# Patient Record
Sex: FEMALE | Race: WHITE | NOT HISPANIC OR LATINO
[De-identification: names, ages, dates, MRNs, and addresses within clinical notes are randomized per-mention and may not be internally consistent; named-entity substitution may affect disease eponyms.]

---

## 2020-12-02 ENCOUNTER — APPOINTMENT (OUTPATIENT)
Dept: GASTROENTEROLOGY | Facility: CLINIC | Age: 30
End: 2020-12-02
Payer: COMMERCIAL

## 2020-12-02 VITALS
HEIGHT: 60.25 IN | DIASTOLIC BLOOD PRESSURE: 92 MMHG | WEIGHT: 207 LBS | SYSTOLIC BLOOD PRESSURE: 140 MMHG | HEART RATE: 101 BPM | BODY MASS INDEX: 40.11 KG/M2

## 2020-12-02 DIAGNOSIS — K52.9 NONINFECTIVE GASTROENTERITIS AND COLITIS, UNSPECIFIED: ICD-10-CM

## 2020-12-02 DIAGNOSIS — K62.5 HEMORRHAGE OF ANUS AND RECTUM: ICD-10-CM

## 2020-12-02 DIAGNOSIS — Z12.11 ENCOUNTER FOR SCREENING FOR MALIGNANT NEOPLASM OF COLON: ICD-10-CM

## 2020-12-02 DIAGNOSIS — Z91.89 OTHER SPECIFIED PERSONAL RISK FACTORS, NOT ELSEWHERE CLASSIFIED: ICD-10-CM

## 2020-12-02 PROCEDURE — 99204 OFFICE O/P NEW MOD 45 MIN: CPT

## 2020-12-02 PROCEDURE — 99072 ADDL SUPL MATRL&STAF TM PHE: CPT

## 2020-12-04 PROBLEM — K52.9 CHRONIC DIARRHEA: Status: ACTIVE | Noted: 2020-12-04

## 2020-12-04 NOTE — HISTORY OF PRESENT ILLNESS
[FreeTextEntry1] : 30 year F h/o GERD on PPI, PCOS, Fibromyalgia, bipolar 2, anxiety, spondyloarthropathy , exercise induced asthma, cyst removed from frontal lobe, s/p gastric sleeve 2010, s/p adenoidectomy, family h/o colon polyps, presents for evaluation of colon cancer screening. \par She is seen at the request of Dr. Kareen Souza. \par \par No prior colonoscopy or EGD\par previous nausea and vomiting from GERD now resolved on PPI\par BRB occasionally on TP, and prolapse from rectum a couple of weeks ago. \par she has chronic diarrhea\par \par Patient denies abdominal pain , dysphagia/odynophagia, change in bowel habits, constipation, brbpr, melena. no weight loss.  Good appetite. Patient has daily BM,\par takes NSAIDS PRN\par \par father- colon polyps\par brother 28 yo - colon polyps\par \par PMD: Dr. Kareen Souza

## 2020-12-04 NOTE — PHYSICAL EXAM
[General Appearance - Alert] : alert [General Appearance - In No Acute Distress] : in no acute distress [Sclera] : the sclera and conjunctiva were normal [Outer Ear] : the ears and nose were normal in appearance [Neck Appearance] : the appearance of the neck was normal [] : no respiratory distress [Apical Impulse] : the apical impulse was normal [Abdomen Soft] : soft [Abdomen Tenderness] : non-tender [FreeTextEntry1] : external hemorrhoid , normal RENATE, brown stool. Chaperoned by Dina Abernathy MA [Abnormal Walk] : normal gait [Skin Color & Pigmentation] : normal skin color and pigmentation [Cranial Nerves] : cranial nerves 2-12 were intact [Oriented To Time, Place, And Person] : oriented to person, place, and time

## 2020-12-04 NOTE — ASSESSMENT
[FreeTextEntry1] : Colon cancer screening- \par due to family h/o colon cancer /colon polyps recommend she start screening now.\par Risks (including but not limited to sedation risks, infection, bleeding, perforation, possibility of missed lesions), benefits and alternatives to procedure, including not doing the procedure, were discussed with patient. Patient understood and agreed to proceed with colonoscopy. \par Colonoscopy preparation instructions reviewed with patient.\par 2 Dulcolax two days prior to procedure + Split MiraLAX/Dulcolax preparation starting day prior to procedure\par \par rectal bleeding- likely hemorrhoids, colonoscopy to r/o proximal lesions\par \par chronic diarrhea- will obtain biopsies to r/o colitis at time of colonoscopy\par \par GERD- EGD, continue PPI

## 2021-01-06 ENCOUNTER — APPOINTMENT (OUTPATIENT)
Dept: INTERNAL MEDICINE | Facility: CLINIC | Age: 31
End: 2021-01-06
Payer: COMMERCIAL

## 2021-01-06 VITALS — BODY MASS INDEX: 40.11 KG/M2 | TEMPERATURE: 98 F | WEIGHT: 207 LBS | HEIGHT: 60.25 IN

## 2021-01-06 VITALS — DIASTOLIC BLOOD PRESSURE: 80 MMHG | SYSTOLIC BLOOD PRESSURE: 120 MMHG

## 2021-01-06 DIAGNOSIS — F41.9 ANXIETY DISORDER, UNSPECIFIED: ICD-10-CM

## 2021-01-06 DIAGNOSIS — Z23 ENCOUNTER FOR IMMUNIZATION: ICD-10-CM

## 2021-01-06 DIAGNOSIS — F98.8 OTHER SPECIFIED BEHAVIORAL AND EMOTIONAL DISORDERS WITH ONSET USUALLY OCCURRING IN CHILDHOOD AND ADOLESCENCE: ICD-10-CM

## 2021-01-06 DIAGNOSIS — Z84.0 FAMILY HISTORY OF DISEASES OF THE SKIN AND SUBCUTANEOUS TISSUE: ICD-10-CM

## 2021-01-06 DIAGNOSIS — Z82.61 FAMILY HISTORY OF ARTHRITIS: ICD-10-CM

## 2021-01-06 DIAGNOSIS — T78.40XA ALLERGY, UNSPECIFIED, INITIAL ENCOUNTER: ICD-10-CM

## 2021-01-06 DIAGNOSIS — Z83.71 FAMILY HISTORY OF COLONIC POLYPS: ICD-10-CM

## 2021-01-06 DIAGNOSIS — J45.990 EXERCISE INDUCED BRONCHOSPASM: ICD-10-CM

## 2021-01-06 DIAGNOSIS — M47.819 SPONDYLOSIS W/OUT MYELOPATHY OR RADICULOPATHY, SITE UNSPECIFIED: ICD-10-CM

## 2021-01-06 DIAGNOSIS — Z00.00 ENCOUNTER FOR GENERAL ADULT MEDICAL EXAMINATION W/OUT ABNORMAL FINDINGS: ICD-10-CM

## 2021-01-06 DIAGNOSIS — Z11.1 ENCOUNTER FOR SCREENING FOR RESPIRATORY TUBERCULOSIS: ICD-10-CM

## 2021-01-06 DIAGNOSIS — F17.200 NICOTINE DEPENDENCE, UNSPECIFIED, UNCOMPLICATED: ICD-10-CM

## 2021-01-06 PROCEDURE — G0444 DEPRESSION SCREEN ANNUAL: CPT

## 2021-01-06 PROCEDURE — 90715 TDAP VACCINE 7 YRS/> IM: CPT

## 2021-01-06 PROCEDURE — 90472 IMMUNIZATION ADMIN EACH ADD: CPT

## 2021-01-06 PROCEDURE — 99072 ADDL SUPL MATRL&STAF TM PHE: CPT

## 2021-01-06 PROCEDURE — 90620 MENB-4C VACCINE IM: CPT

## 2021-01-06 PROCEDURE — 36415 COLL VENOUS BLD VENIPUNCTURE: CPT

## 2021-01-06 PROCEDURE — 99385 PREV VISIT NEW AGE 18-39: CPT | Mod: 25

## 2021-01-06 PROCEDURE — 90471 IMMUNIZATION ADMIN: CPT

## 2021-01-06 RX ORDER — ALPRAZOLAM 0.25 MG/1
0.25 TABLET ORAL
Refills: 0 | Status: ACTIVE | COMMUNITY
Start: 2021-01-06

## 2021-01-06 RX ORDER — PAROXETINE HYDROCHLORIDE 20 MG/1
20 TABLET, FILM COATED ORAL DAILY
Refills: 0 | Status: ACTIVE | COMMUNITY

## 2021-01-06 RX ORDER — DEXTROAMPHETAMINE SACCHARATE, AMPHETAMINE ASPARTATE, DEXTROAMPHETAMINE SULFATE, AND AMPHETAMINE SULFATE 5; 5; 5; 5 MG/1; MG/1; MG/1; MG/1
20 TABLET ORAL DAILY
Refills: 0 | Status: ACTIVE | COMMUNITY

## 2021-01-06 RX ORDER — DEXTROAMPHETAMINE SACCHARATE, AMPHETAMINE ASPARTATE MONOHYDRATE, DEXTROAMPHETAMINE SULFATE AND AMPHETAMINE SULFATE 5; 5; 5; 5 MG/1; MG/1; MG/1; MG/1
20 CAPSULE, EXTENDED RELEASE ORAL DAILY
Qty: 30 | Refills: 0 | Status: ACTIVE | COMMUNITY
Start: 2021-01-06

## 2021-01-06 RX ORDER — FEXOFENADINE HCL 60 MG
CAPSULE ORAL
Refills: 0 | Status: ACTIVE | COMMUNITY

## 2021-01-06 RX ORDER — ALBUTEROL 90 MCG
90 AEROSOL (GRAM) INHALATION
Refills: 0 | Status: DISCONTINUED | COMMUNITY
End: 2021-01-06

## 2021-01-06 NOTE — HISTORY OF PRESENT ILLNESS
[FreeTextEntry1] : physical [de-identified] : Patient with a hx of PCOS, Sleeve in 2011, Bipolar 2, anxiety, eczema, fibromyalgia, spondyloarthropathy presents today for annual exam and for school physical forms\par \par GYN: Dr. 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

## 2021-01-06 NOTE — HEALTH RISK ASSESSMENT
[] : Yes [6-10] : 6-10 [2 - 4 times a month (2 pts)] : 2-4 times a month (2 points) [1 or 2 (0 pts)] : 1 or 2 (0 points) [Never (0 pts)] : Never (0 points) [Yes] : In the past 12 months have you used drugs other than those required for medical reasons? Yes [No falls in past year] : Patient reported no falls in the past year [0] : 2) Feeling down, depressed, or hopeless: Not at all (0) [Patient reported PAP Smear was abnormal] : Patient reported PAP Smear was abnormal [HIV Test offered] : HIV Test offered [Hepatitis C test offered] : Hepatitis C test offered [With Family] : lives with family [# of Members in Household ___] :  household currently consist of [unfilled] member(s) [Unemployed] : unemployed [Student] : student [College] : College [Single] : single [# Of Children ___] : has [unfilled] children [Feels Safe at Home] : Feels safe at home [Reports normal functional visual acuity (ie: able to read med bottle)] : Reports normal functional visual acuity [Smoke Detector] : smoke detector [Carbon Monoxide Detector] : carbon monoxide detector [Seat Belt] :  uses seat belt [Sunscreen] : uses sunscreen [With Patient/Caregiver] : With Patient/Caregiver [de-identified] : socially  [Audit-CScore] : 2 [de-identified] : marijuana  [de-identified] : walks hikes stretching  [de-identified] : normal  [[id number]] : 0 [Reports changes in hearing] : Reports no changes in hearing [Reports changes in vision] : Reports no changes in vision [Reports changes in dental health] : Reports no changes in dental health [Guns at Home] : no guns at home [Safety elements used in home] : no safety elements used in home [Travel to Developing Areas] : does not  travel to developing areas [Caregiver Concerns] : does not have caregiver concerns [MammogramDate] : never [PapSmearDate] : 9/2020 [PapSmearComments] : [facility]  [BoneDensityDate] : never [ColonoscopyDate] : never [ColonoscopyComments] : has apt 1/25, family hx of prostate cancer  [de-identified] : [employer] @ [employer]  [FreeTextEntry2] : covid -unemployed  [de-identified] : in college now  [de-identified] : last exam 12/2020 [de-identified] : last exam 1/2020 [AdvancecareDate] : 1/2021

## 2021-01-06 NOTE — PHYSICAL EXAM
[No Acute Distress] : no acute distress [Well Nourished] : well nourished [Well Developed] : well developed [Well-Appearing] : well-appearing [Normal Sclera/Conjunctiva] : normal sclera/conjunctiva [PERRL] : pupils equal round and reactive to light [EOMI] : extraocular movements intact [Normal Outer Ear/Nose] : the outer ears and nose were normal in appearance [Normal Oropharynx] : the oropharynx was normal [No Lymphadenopathy] : no lymphadenopathy [Supple] : supple [Thyroid Normal, No Nodules] : the thyroid was normal and there were no nodules present [No Respiratory Distress] : no respiratory distress  [No Accessory Muscle Use] : no accessory muscle use [Clear to Auscultation] : lungs were clear to auscultation bilaterally [Normal Rate] : normal rate  [Regular Rhythm] : with a regular rhythm [Normal S1, S2] : normal S1 and S2 [No Murmur] : no murmur heard [No Carotid Bruits] : no carotid bruits [No Abdominal Bruit] : a ~M bruit was not heard ~T in the abdomen [No Varicosities] : no varicosities [Pedal Pulses Present] : the pedal pulses are present [No Edema] : there was no peripheral edema [No Palpable Aorta] : no palpable aorta [No Extremity Clubbing/Cyanosis] : no extremity clubbing/cyanosis [Soft] : abdomen soft [Non Tender] : non-tender [Non-distended] : non-distended [No Masses] : no abdominal mass palpated [No HSM] : no HSM [Normal Bowel Sounds] : normal bowel sounds [Normal Posterior Cervical Nodes] : no posterior cervical lymphadenopathy [Normal Anterior Cervical Nodes] : no anterior cervical lymphadenopathy [No CVA Tenderness] : no CVA  tenderness [No Spinal Tenderness] : no spinal tenderness [No Joint Swelling] : no joint swelling [Grossly Normal Strength/Tone] : grossly normal strength/tone [No Rash] : no rash [Coordination Grossly Intact] : coordination grossly intact [No Focal Deficits] : no focal deficits [Normal Gait] : normal gait [Normal Affect] : the affect was normal [Normal Insight/Judgement] : insight and judgment were intact

## 2021-01-07 LAB
25(OH)D3 SERPL-MCNC: 18.5 NG/ML
ALBUMIN SERPL ELPH-MCNC: 4.4 G/DL
ALP BLD-CCNC: 83 U/L
ALT SERPL-CCNC: 50 U/L
ANION GAP SERPL CALC-SCNC: 12 MMOL/L
AST SERPL-CCNC: 41 U/L
BASOPHILS # BLD AUTO: 0.08 K/UL
BASOPHILS NFR BLD AUTO: 1.1 %
BILIRUB SERPL-MCNC: 0.2 MG/DL
BUN SERPL-MCNC: 7 MG/DL
CALCIUM SERPL-MCNC: 9.4 MG/DL
CHLORIDE SERPL-SCNC: 103 MMOL/L
CHOLEST SERPL-MCNC: 213 MG/DL
CO2 SERPL-SCNC: 23 MMOL/L
CREAT SERPL-MCNC: 0.77 MG/DL
EOSINOPHIL # BLD AUTO: 0.18 K/UL
EOSINOPHIL NFR BLD AUTO: 2.6 %
ESTIMATED AVERAGE GLUCOSE: 97 MG/DL
GLUCOSE SERPL-MCNC: 83 MG/DL
HBA1C MFR BLD HPLC: 5 %
HBV CORE IGG+IGM SER QL: NONREACTIVE
HBV SURFACE AB SER QL: REACTIVE
HBV SURFACE AG SER QL: NONREACTIVE
HCT VFR BLD CALC: 42.6 %
HDLC SERPL-MCNC: 68 MG/DL
HGB BLD-MCNC: 13.7 G/DL
IMM GRANULOCYTES NFR BLD AUTO: 0.4 %
LDLC SERPL CALC-MCNC: 120 MG/DL
LYMPHOCYTES # BLD AUTO: 1.93 K/UL
LYMPHOCYTES NFR BLD AUTO: 27.4 %
MAN DIFF?: NORMAL
MCHC RBC-ENTMCNC: 28.5 PG
MCHC RBC-ENTMCNC: 32.2 GM/DL
MCV RBC AUTO: 88.8 FL
MEV IGG FLD QL IA: 151 AU/ML
MEV IGG+IGM SER-IMP: POSITIVE
MONOCYTES # BLD AUTO: 0.65 K/UL
MONOCYTES NFR BLD AUTO: 9.2 %
MUV AB SER-ACNC: POSITIVE
MUV IGG SER QL IA: 35.8 AU/ML
NEUTROPHILS # BLD AUTO: 4.18 K/UL
NEUTROPHILS NFR BLD AUTO: 59.3 %
NONHDLC SERPL-MCNC: 145 MG/DL
PLATELET # BLD AUTO: 343 K/UL
POTASSIUM SERPL-SCNC: 4.6 MMOL/L
PROT SERPL-MCNC: 7 G/DL
RBC # BLD: 4.8 M/UL
RBC # FLD: 13.1 %
RUBV IGG FLD-ACNC: 3.4 INDEX
RUBV IGG SER-IMP: POSITIVE
SODIUM SERPL-SCNC: 139 MMOL/L
TRIGL SERPL-MCNC: 125 MG/DL
TSH SERPL-ACNC: 1.66 UIU/ML
VZV AB TITR SER: POSITIVE
VZV IGG SER IF-ACNC: 426.6 INDEX
WBC # FLD AUTO: 7.05 K/UL

## 2021-01-11 LAB
M TB IFN-G BLD-IMP: NEGATIVE
QUANTIFERON TB PLUS MITOGEN MINUS NIL: >10 IU/ML
QUANTIFERON TB PLUS NIL: 0.02 IU/ML
QUANTIFERON TB PLUS TB1 MINUS NIL: 0 IU/ML
QUANTIFERON TB PLUS TB2 MINUS NIL: 0 IU/ML

## 2021-01-20 ENCOUNTER — TRANSCRIPTION ENCOUNTER (OUTPATIENT)
Age: 31
End: 2021-01-20

## 2021-01-20 ENCOUNTER — APPOINTMENT (OUTPATIENT)
Dept: OBGYN | Facility: CLINIC | Age: 31
End: 2021-01-20
Payer: COMMERCIAL

## 2021-01-20 VITALS
DIASTOLIC BLOOD PRESSURE: 70 MMHG | BODY MASS INDEX: 41.62 KG/M2 | SYSTOLIC BLOOD PRESSURE: 120 MMHG | TEMPERATURE: 98.6 F | WEIGHT: 212 LBS | HEIGHT: 60 IN

## 2021-01-20 DIAGNOSIS — Z86.59 PERSONAL HISTORY OF OTHER MENTAL AND BEHAVIORAL DISORDERS: ICD-10-CM

## 2021-01-20 DIAGNOSIS — J45.20 MILD INTERMITTENT ASTHMA, UNCOMPLICATED: ICD-10-CM

## 2021-01-20 DIAGNOSIS — Z87.19 PERSONAL HISTORY OF OTHER DISEASES OF THE DIGESTIVE SYSTEM: ICD-10-CM

## 2021-01-20 PROCEDURE — 99385 PREV VISIT NEW AGE 18-39: CPT

## 2021-01-20 PROCEDURE — 99072 ADDL SUPL MATRL&STAF TM PHE: CPT

## 2021-01-25 ENCOUNTER — RESULT REVIEW (OUTPATIENT)
Age: 31
End: 2021-01-25

## 2021-01-27 ENCOUNTER — RESULT REVIEW (OUTPATIENT)
Age: 31
End: 2021-01-27

## 2021-01-28 ENCOUNTER — APPOINTMENT (OUTPATIENT)
Dept: GASTROENTEROLOGY | Facility: HOSPITAL | Age: 31
End: 2021-01-28

## 2021-01-28 ENCOUNTER — RESULT REVIEW (OUTPATIENT)
Age: 31
End: 2021-01-28

## 2021-01-30 ENCOUNTER — NON-APPOINTMENT (OUTPATIENT)
Age: 31
End: 2021-01-30

## 2021-05-07 ENCOUNTER — NON-APPOINTMENT (OUTPATIENT)
Age: 31
End: 2021-05-07

## 2021-05-15 ENCOUNTER — APPOINTMENT (OUTPATIENT)
Dept: INTERNAL MEDICINE | Facility: CLINIC | Age: 31
End: 2021-05-15
Payer: COMMERCIAL

## 2021-05-15 VITALS — HEIGHT: 60 IN | TEMPERATURE: 98 F | BODY MASS INDEX: 40.25 KG/M2 | WEIGHT: 205 LBS

## 2021-05-15 VITALS — DIASTOLIC BLOOD PRESSURE: 70 MMHG | SYSTOLIC BLOOD PRESSURE: 120 MMHG

## 2021-05-15 DIAGNOSIS — F31.81 BIPOLAR II DISORDER: ICD-10-CM

## 2021-05-15 PROCEDURE — 99213 OFFICE O/P EST LOW 20 MIN: CPT

## 2021-05-15 PROCEDURE — 99072 ADDL SUPL MATRL&STAF TM PHE: CPT

## 2021-05-15 NOTE — HISTORY OF PRESENT ILLNESS
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

## 2021-05-15 NOTE — PHYSICAL EXAM
[Normal Sclera/Conjunctiva] : normal sclera/conjunctiva [Normal Outer Ear/Nose] : the outer ears and nose were normal in appearance [Normal] : normal rate, regular rhythm, normal S1 and S2 and no murmur heard [No Edema] : there was no peripheral edema

## 2021-07-12 ENCOUNTER — APPOINTMENT (OUTPATIENT)
Dept: INTERNAL MEDICINE | Facility: CLINIC | Age: 31
End: 2021-07-12

## 2021-07-28 ENCOUNTER — APPOINTMENT (OUTPATIENT)
Dept: GASTROENTEROLOGY | Facility: CLINIC | Age: 31
End: 2021-07-28
Payer: COMMERCIAL

## 2021-07-28 VITALS
WEIGHT: 200 LBS | TEMPERATURE: 98 F | OXYGEN SATURATION: 99 % | DIASTOLIC BLOOD PRESSURE: 84 MMHG | HEIGHT: 60 IN | RESPIRATION RATE: 16 BRPM | SYSTOLIC BLOOD PRESSURE: 122 MMHG | BODY MASS INDEX: 39.27 KG/M2 | HEART RATE: 87 BPM

## 2021-07-28 DIAGNOSIS — R14.2 ERUCTATION: ICD-10-CM

## 2021-07-28 PROCEDURE — 99213 OFFICE O/P EST LOW 20 MIN: CPT

## 2021-07-28 RX ORDER — OMEPRAZOLE 20 MG/1
20 CAPSULE, DELAYED RELEASE ORAL
Qty: 90 | Refills: 3 | Status: DISCONTINUED | COMMUNITY
End: 2021-07-28

## 2021-07-28 NOTE — ASSESSMENT
[FreeTextEntry1] : Belching-\par suspect diet.lifestyle facotr\par eliminate carbonated beverages\par avoid aerophagia, \par food diary\par continue omeprazole 40 mg daily, Pepcid PRN\par \par GERD-s/p gastric sleeve, continue PPI

## 2021-07-28 NOTE — HISTORY OF PRESENT ILLNESS
[FreeTextEntry1] : 30 year F h/o GERD on PPI, PCOS, Fibromyalgia, bipolar 2, anxiety, spondyloarthropathy , exercise induced asthma, cyst removed from frontal lobe, s/p gastric sleeve 2010, s/p adenoidectomy, family h/o colon polyps, presents for evaluation of frequent belching. She is seen at the request of Dr. Kareen Souza. \par \par burping x 1 month, off and on, 2x per week. does not occur during sleep. \par she has lost weight. 10-15 lbs in last 6 months. \par diet is healthier and walking frequently outside. Drinks a lot of water, sometimes carbonated beverages. \par no clear ppt factors. \par can last for 1 hour or resolve with vomiting \par if it goes on for a while- can feel nausea, burps are acidic. worse on left side\par \par she has been on omeprazole 20 mg for 1 year for chronic GERD with N/V, she increased to 40 mg daily and symptoms improved slightly. \par bm now normal. no longer having diarrhea\par she lives on a boat, eats of of ice box\par current meds have been stable x 2 3- months prior to burping starting. \par \par EGD/Colonoscopy 1/28/21- GERD/Rectal bleeding/diarrhea- -EGd notable for gastric sleeve, irregular zline\par benign path, \par ileocolonoscopy with bx- internal hemorrhoids, normal expect single focus of mild increased lymphocytes in right colon bx\par recall in 5 years due to family h/o colon polyps. \par \par \par \par Patient denies abdominal pain , dysphagia/odynophagia, change in bowel habits, constipation, diarrhea, brbpr, melena. no weight loss.  Good appetite. Patient has daily BM,\par takes NSAIDS PRN\par \par father- colon polyps\par brother 28 yo - colon polyps\par \par PMD: Dr. Kareen Souza

## 2021-07-29 LAB
CYTOLOGY CVX/VAG DOC THIN PREP: ABNORMAL
HPV HIGH+LOW RISK DNA PNL CVX: NOT DETECTED

## 2021-08-13 ENCOUNTER — TRANSCRIPTION ENCOUNTER (OUTPATIENT)
Age: 31
End: 2021-08-13

## 2021-09-17 ENCOUNTER — NON-APPOINTMENT (OUTPATIENT)
Age: 31
End: 2021-09-17

## 2021-09-20 ENCOUNTER — RESULT REVIEW (OUTPATIENT)
Age: 31
End: 2021-09-20

## 2021-09-20 ENCOUNTER — APPOINTMENT (OUTPATIENT)
Dept: RHEUMATOLOGY | Facility: CLINIC | Age: 31
End: 2021-09-20
Payer: COMMERCIAL

## 2021-09-20 ENCOUNTER — NON-APPOINTMENT (OUTPATIENT)
Age: 31
End: 2021-09-20

## 2021-09-20 VITALS
BODY MASS INDEX: 39.27 KG/M2 | SYSTOLIC BLOOD PRESSURE: 146 MMHG | WEIGHT: 200 LBS | TEMPERATURE: 97.8 F | DIASTOLIC BLOOD PRESSURE: 88 MMHG | HEIGHT: 60 IN

## 2021-09-20 DIAGNOSIS — M54.5 LOW BACK PAIN: ICD-10-CM

## 2021-09-20 PROCEDURE — 99205 OFFICE O/P NEW HI 60 MIN: CPT | Mod: 25

## 2021-09-22 LAB
25(OH)D3 SERPL-MCNC: 27.1 NG/ML
ALBUMIN SERPL ELPH-MCNC: 4.7 G/DL
ALP BLD-CCNC: 108 U/L
ALT SERPL-CCNC: 55 U/L
ANA SER IF-ACNC: NEGATIVE
ANION GAP SERPL CALC-SCNC: 15 MMOL/L
AST SERPL-CCNC: 50 U/L
BASOPHILS # BLD AUTO: 0.06 K/UL
BASOPHILS NFR BLD AUTO: 0.7 %
BILIRUB SERPL-MCNC: 0.3 MG/DL
BUN SERPL-MCNC: 9 MG/DL
CALCIUM SERPL-MCNC: 9.6 MG/DL
CCP AB SER IA-ACNC: <8 UNITS
CHLORIDE SERPL-SCNC: 101 MMOL/L
CO2 SERPL-SCNC: 24 MMOL/L
CREAT SERPL-MCNC: 0.9 MG/DL
CRP SERPL-MCNC: 5 MG/L
EOSINOPHIL # BLD AUTO: 0.09 K/UL
EOSINOPHIL NFR BLD AUTO: 1 %
ERYTHROCYTE [SEDIMENTATION RATE] IN BLOOD BY WESTERGREN METHOD: 20 MM/HR
GLUCOSE SERPL-MCNC: 95 MG/DL
HBV CORE IGG+IGM SER QL: NONREACTIVE
HBV SURFACE AB SER QL: REACTIVE
HBV SURFACE AG SER QL: NONREACTIVE
HCT VFR BLD CALC: 39.6 %
HCV AB SER QL: NONREACTIVE
HCV S/CO RATIO: 0.14 S/CO
HGB BLD-MCNC: 11.6 G/DL
IMM GRANULOCYTES NFR BLD AUTO: 0.2 %
LYMPHOCYTES # BLD AUTO: 1.85 K/UL
LYMPHOCYTES NFR BLD AUTO: 21.1 %
M TB IFN-G BLD-IMP: NEGATIVE
MAN DIFF?: NORMAL
MCHC RBC-ENTMCNC: 24.8 PG
MCHC RBC-ENTMCNC: 29.3 GM/DL
MCV RBC AUTO: 84.6 FL
MONOCYTES # BLD AUTO: 0.54 K/UL
MONOCYTES NFR BLD AUTO: 6.2 %
NEUTROPHILS # BLD AUTO: 6.22 K/UL
NEUTROPHILS NFR BLD AUTO: 70.8 %
PLATELET # BLD AUTO: 425 K/UL
POTASSIUM SERPL-SCNC: 4.7 MMOL/L
PROT SERPL-MCNC: 7 G/DL
QUANTIFERON TB PLUS MITOGEN MINUS NIL: 2.65 IU/ML
QUANTIFERON TB PLUS NIL: 0.02 IU/ML
QUANTIFERON TB PLUS TB1 MINUS NIL: 0 IU/ML
QUANTIFERON TB PLUS TB2 MINUS NIL: -0.01 IU/ML
RBC # BLD: 4.68 M/UL
RBC # FLD: 15.3 %
RF+CCP IGG SER-IMP: NEGATIVE
RHEUMATOID FACT SER QL: <10 IU/ML
SODIUM SERPL-SCNC: 141 MMOL/L
WBC # FLD AUTO: 8.78 K/UL

## 2021-09-23 NOTE — HISTORY OF PRESENT ILLNESS
[FreeTextEntry1] : 32 yo female here for management of spondyloarthropathy.  She was diagnosed at age 17 with spondyloarthropathy and fibromyalgia by Dr. Velazquez at [facility].  Initially she went to orthopedist bc her ankles were giving out.  She was then referred to the rheumatologist.  She had bloodwork and was told she had spondyloarthropathy.  She was treated with Relafen and Naproxen PRN.  As she is getting older, her achiness and stiffness is getting worse.\par Her father has psoriatic arthritis.  She has psoriasis.\par She is having a flareup of psoriasis.  At the same time she has ankle, knee, back, wrist, finger pain.  No joint swelling.  +stiffness.  Symptoms worse in the morning.\par She has trouble sleeping at night bc of pain.\par Her dermatologist (PA at [facility]) gives her IM Kenalog every 2-3 months to manage skin flares.  It went away after the first flare.  The most recent injection has not helped skin or joints.\par She does not apply steroid cream on her lesions.  Lesions sometimes itch.  Unsure if sun makes them  better or worse.\par \par She gets upper back pain.\par \par She has fibromyalgia, right Achilles tendinitis.

## 2021-09-28 LAB — HLA-B27 RELATED AG QL: POSITIVE

## 2021-10-18 ENCOUNTER — APPOINTMENT (OUTPATIENT)
Dept: FAMILY MEDICINE | Facility: CLINIC | Age: 31
End: 2021-10-18
Payer: COMMERCIAL

## 2021-10-18 VITALS
SYSTOLIC BLOOD PRESSURE: 122 MMHG | TEMPERATURE: 97.7 F | OXYGEN SATURATION: 99 % | HEIGHT: 60 IN | WEIGHT: 206 LBS | BODY MASS INDEX: 40.44 KG/M2 | HEART RATE: 124 BPM | DIASTOLIC BLOOD PRESSURE: 80 MMHG

## 2021-10-18 VITALS — HEART RATE: 101 BPM

## 2021-10-18 DIAGNOSIS — Z11.51 ENCOUNTER FOR SCREENING FOR HUMAN PAPILLOMAVIRUS (HPV): ICD-10-CM

## 2021-10-18 DIAGNOSIS — L30.9 DERMATITIS, UNSPECIFIED: ICD-10-CM

## 2021-10-18 DIAGNOSIS — Z01.84 ENCOUNTER FOR ANTIBODY RESPONSE EXAMINATION: ICD-10-CM

## 2021-10-18 PROCEDURE — 99213 OFFICE O/P EST LOW 20 MIN: CPT

## 2021-10-18 RX ORDER — OMEPRAZOLE 20 MG/1
20 CAPSULE, DELAYED RELEASE ORAL TWICE DAILY
Refills: 0 | Status: DISCONTINUED | COMMUNITY
End: 2021-10-18

## 2021-10-19 PROBLEM — Z01.84 ANTIBODY RESPONSE EXAM: Status: RESOLVED | Noted: 2021-01-06 | Resolved: 2021-10-19

## 2021-10-19 PROBLEM — L30.9 DERMATITIS: Status: ACTIVE | Noted: 2021-10-18

## 2021-10-19 PROBLEM — Z11.51 SCREENING FOR HPV (HUMAN PAPILLOMAVIRUS): Status: RESOLVED | Noted: 2021-01-20 | Resolved: 2021-10-19

## 2021-10-19 NOTE — HEALTH RISK ASSESSMENT
[] : Yes [5-9] : 5-9 [Yes] : Yes [No falls in past year] : Patient reported no falls in the past year [0] : 2) Feeling down, depressed, or hopeless: Not at all (0) [de-identified] : Social [[id number]] : 0

## 2021-10-19 NOTE — HISTORY OF PRESENT ILLNESS
[FreeTextEntry8] : Pt has a rash all over her body except for her face. Did travel recently and thinks the sheets she slept on may have caused the rash. It is very itchy. Went to ER yesterday. Dx with dermatitis. prescribed prednisone. Got one dose in the ER yesterday but still has to  the medication. Given Prednisone 50mg for 5 days. the one dose did help.

## 2021-10-19 NOTE — PHYSICAL EXAM
[Normal] : soft, non-tender, non-distended, no masses palpated, no HSM and normal bowel sounds [de-identified] : tachicardic. pt says she is nervous and drank coffee.  [de-identified] : maculopapular rash over trunk and extremities sparing face.

## 2021-10-26 ENCOUNTER — APPOINTMENT (OUTPATIENT)
Dept: RHEUMATOLOGY | Facility: CLINIC | Age: 31
End: 2021-10-26
Payer: COMMERCIAL

## 2021-10-26 VITALS
TEMPERATURE: 98.6 F | WEIGHT: 205 LBS | BODY MASS INDEX: 40.25 KG/M2 | DIASTOLIC BLOOD PRESSURE: 86 MMHG | HEIGHT: 60 IN | SYSTOLIC BLOOD PRESSURE: 136 MMHG

## 2021-10-26 DIAGNOSIS — Q79.62 HYPERMOBILE EHLERS-DANLOS SYNDROME: ICD-10-CM

## 2021-10-26 DIAGNOSIS — G89.29 PAIN IN UNSPECIFIED JOINT: ICD-10-CM

## 2021-10-26 DIAGNOSIS — M25.50 PAIN IN UNSPECIFIED JOINT: ICD-10-CM

## 2021-10-26 DIAGNOSIS — Z23 ENCOUNTER FOR IMMUNIZATION: ICD-10-CM

## 2021-10-26 PROCEDURE — 90686 IIV4 VACC NO PRSV 0.5 ML IM: CPT

## 2021-10-26 PROCEDURE — 99214 OFFICE O/P EST MOD 30 MIN: CPT | Mod: 25

## 2021-10-26 PROCEDURE — G0008: CPT

## 2021-10-28 PROBLEM — M25.50 CHRONIC PAIN OF MULTIPLE JOINTS: Status: ACTIVE | Noted: 2021-09-20

## 2021-10-28 PROBLEM — Q79.62 EHLERS-DANLOS SYNDROME, TYPE 3: Status: ACTIVE | Noted: 2021-09-20

## 2021-10-28 NOTE — HISTORY OF PRESENT ILLNESS
[FreeTextEntry1] : She went to [place] and developed an itchy rash.  She was given Triamcinolone cream and Prednisone 50 mg daily for 5 days.  She still has a little itch but no more rash.\par She made an appointment to see a bariatric surgery.\par She has psoriasis on her leg, ankle, left breast.\par Her joints still hurt,  When the weather s bad, she feels worse.  Minimal joint swelling.  + morning stiffness.

## 2021-10-28 NOTE — ASSESSMENT
[FreeTextEntry1] : we discussed treatment options. MTX and leflunomide are contraindicated due to her being child-bearing age.  Will start Humira.  Side effect profile discussed.

## 2021-11-17 ENCOUNTER — NON-APPOINTMENT (OUTPATIENT)
Age: 31
End: 2021-11-17

## 2021-11-22 ENCOUNTER — APPOINTMENT (OUTPATIENT)
Dept: RHEUMATOLOGY | Facility: CLINIC | Age: 31
End: 2021-11-22
Payer: COMMERCIAL

## 2021-11-22 PROCEDURE — 99212 OFFICE O/P EST SF 10 MIN: CPT | Mod: 25

## 2021-11-22 PROCEDURE — 96401 CHEMO ANTI-NEOPL SQ/IM: CPT

## 2021-11-22 RX ADMIN — ADALIMUMAB 0 MG/0.8ML: KIT at 00:00

## 2021-11-27 NOTE — PROCEDURE
[FreeTextEntry1] : Area cleaned with alcohol. Humira 40mg/0.4mL injected subcutaneously into left abdomen.  Pt tolerated procedure well.\par \par

## 2021-11-27 NOTE — HISTORY OF PRESENT ILLNESS
[FreeTextEntry1] : She was vomiting for a few days, and was seen in the ER.  She had a GB US and labs/urine.\par All workup was negative.  Symptoms have resolved.  No fevers.\par \par She is here for self-injection training.

## 2021-12-29 ENCOUNTER — NON-APPOINTMENT (OUTPATIENT)
Age: 31
End: 2021-12-29

## 2022-01-11 ENCOUNTER — APPOINTMENT (OUTPATIENT)
Dept: RHEUMATOLOGY | Facility: CLINIC | Age: 32
End: 2022-01-11
Payer: COMMERCIAL

## 2022-01-11 PROCEDURE — 99213 OFFICE O/P EST LOW 20 MIN: CPT | Mod: 95

## 2022-01-11 RX ORDER — ADALIMUMAB 80MG/0.8ML
80 KIT SUBCUTANEOUS
Qty: 0 | Refills: 0 | Status: COMPLETED | OUTPATIENT
Start: 2021-11-22

## 2022-01-11 NOTE — HISTORY OF PRESENT ILLNESS
[Home] : at home, [unfilled] , at the time of the visit. [Medical Office: ([facility])___] : at the medical office located in  [Verbal consent obtained from patient] : the patient, [unfilled] [FreeTextEntry1] : She skipped 2 weeks of Humira bc she got COVID.  She tested positive on 12/10/21.  Then she had a cyst that had to be drained.  She took her fourth dose of Humira yesterday.\par She did have her COVID booster 11/1/21.\par \par The large plaque that she had on her ankle is clear.  Her joints are feeling better.\par Minimal joint stiffness, but only when it is very cold outside.

## 2022-01-20 ENCOUNTER — APPOINTMENT (OUTPATIENT)
Dept: OBGYN | Facility: CLINIC | Age: 32
End: 2022-01-20

## 2022-10-14 ENCOUNTER — TRANSCRIPTION ENCOUNTER (OUTPATIENT)
Age: 32
End: 2022-10-14

## 2022-12-08 ENCOUNTER — TRANSCRIPTION ENCOUNTER (OUTPATIENT)
Age: 32
End: 2022-12-08

## 2022-12-09 ENCOUNTER — TRANSCRIPTION ENCOUNTER (OUTPATIENT)
Age: 32
End: 2022-12-09

## 2023-01-17 ENCOUNTER — TRANSCRIPTION ENCOUNTER (OUTPATIENT)
Age: 33
End: 2023-01-17

## 2023-01-18 ENCOUNTER — TRANSCRIPTION ENCOUNTER (OUTPATIENT)
Age: 33
End: 2023-01-18

## 2023-01-19 ENCOUNTER — TRANSCRIPTION ENCOUNTER (OUTPATIENT)
Age: 33
End: 2023-01-19

## 2023-01-24 ENCOUNTER — TRANSCRIPTION ENCOUNTER (OUTPATIENT)
Age: 33
End: 2023-01-24

## 2023-03-06 ENCOUNTER — APPOINTMENT (OUTPATIENT)
Dept: INTERNAL MEDICINE | Facility: CLINIC | Age: 33
End: 2023-03-06

## 2023-04-05 ENCOUNTER — APPOINTMENT (OUTPATIENT)
Dept: INTERNAL MEDICINE | Facility: CLINIC | Age: 33
End: 2023-04-05

## 2023-04-17 ENCOUNTER — APPOINTMENT (OUTPATIENT)
Dept: RHEUMATOLOGY | Facility: CLINIC | Age: 33
End: 2023-04-17

## 2023-04-28 ENCOUNTER — RX RENEWAL (OUTPATIENT)
Age: 33
End: 2023-04-28

## 2023-05-01 ENCOUNTER — NON-APPOINTMENT (OUTPATIENT)
Age: 33
End: 2023-05-01

## 2023-05-01 ENCOUNTER — APPOINTMENT (OUTPATIENT)
Dept: NEUROLOGY | Facility: CLINIC | Age: 33
End: 2023-05-01
Payer: COMMERCIAL

## 2023-05-01 VITALS
OXYGEN SATURATION: 97 % | SYSTOLIC BLOOD PRESSURE: 125 MMHG | DIASTOLIC BLOOD PRESSURE: 85 MMHG | BODY MASS INDEX: 41.23 KG/M2 | HEIGHT: 60 IN | WEIGHT: 210 LBS | HEART RATE: 100 BPM

## 2023-05-01 DIAGNOSIS — R42 DIZZINESS AND GIDDINESS: ICD-10-CM

## 2023-05-01 DIAGNOSIS — G93.0 CEREBRAL CYSTS: ICD-10-CM

## 2023-05-01 PROCEDURE — 99205 OFFICE O/P NEW HI 60 MIN: CPT

## 2023-05-01 RX ORDER — BUPROPION HYDROCHLORIDE 150 MG/1
150 TABLET, EXTENDED RELEASE ORAL
Qty: 30 | Refills: 0 | Status: ACTIVE | COMMUNITY
Start: 2023-04-28

## 2023-05-01 RX ORDER — ARIPIPRAZOLE 5 MG/1
5 TABLET ORAL DAILY
Refills: 0 | Status: DISCONTINUED | COMMUNITY
End: 2023-05-01

## 2023-05-01 RX ORDER — PREDNISONE 10 MG/1
10 TABLET ORAL AS DIRECTED
Refills: 0 | Status: DISCONTINUED | COMMUNITY
Start: 2021-10-18 | End: 2023-05-01

## 2023-05-01 RX ORDER — ARIPIPRAZOLE 10 MG/1
10 TABLET ORAL
Refills: 0 | Status: ACTIVE | COMMUNITY

## 2023-05-01 RX ORDER — TRIAMCINOLONE ACETONIDE 1 MG/G
0.1 CREAM TOPICAL TWICE DAILY
Qty: 1 | Refills: 0 | Status: DISCONTINUED | COMMUNITY
Start: 2021-10-18 | End: 2023-05-01

## 2023-05-01 RX ORDER — ALBUTEROL 90 MCG
90 AEROSOL (GRAM) INHALATION
Refills: 0 | Status: ACTIVE | COMMUNITY

## 2023-05-04 ENCOUNTER — APPOINTMENT (OUTPATIENT)
Dept: GASTROENTEROLOGY | Facility: CLINIC | Age: 33
End: 2023-05-04
Payer: COMMERCIAL

## 2023-05-04 VITALS
SYSTOLIC BLOOD PRESSURE: 112 MMHG | HEIGHT: 60 IN | BODY MASS INDEX: 45.94 KG/M2 | DIASTOLIC BLOOD PRESSURE: 75 MMHG | WEIGHT: 234 LBS | HEART RATE: 84 BPM | OXYGEN SATURATION: 100 %

## 2023-05-04 DIAGNOSIS — R11.10 VOMITING, UNSPECIFIED: ICD-10-CM

## 2023-05-04 PROCEDURE — 99204 OFFICE O/P NEW MOD 45 MIN: CPT

## 2023-05-04 RX ORDER — FAMOTIDINE 40 MG/1
40 TABLET, FILM COATED ORAL DAILY
Qty: 90 | Refills: 3 | Status: ACTIVE | COMMUNITY
Start: 2021-07-28 | End: 1900-01-01

## 2023-05-05 PROBLEM — R11.10 VOMITING IN ADULT: Status: ACTIVE | Noted: 2023-05-05

## 2023-05-05 LAB
ALBUMIN SERPL ELPH-MCNC: 4.2 G/DL
ALP BLD-CCNC: 82 U/L
ALT SERPL-CCNC: 59 U/L
ANION GAP SERPL CALC-SCNC: 13 MMOL/L
AST SERPL-CCNC: 35 U/L
BASOPHILS # BLD AUTO: 0.05 K/UL
BASOPHILS NFR BLD AUTO: 0.6 %
BILIRUB SERPL-MCNC: 0.2 MG/DL
BUN SERPL-MCNC: 13 MG/DL
CALCIUM SERPL-MCNC: 9.3 MG/DL
CHLORIDE SERPL-SCNC: 107 MMOL/L
CO2 SERPL-SCNC: 21 MMOL/L
CREAT SERPL-MCNC: 0.61 MG/DL
EGFR: 123 ML/MIN/1.73M2
EOSINOPHIL # BLD AUTO: 0.3 K/UL
EOSINOPHIL NFR BLD AUTO: 3.8 %
ESTIMATED AVERAGE GLUCOSE: 114 MG/DL
GLUCOSE SERPL-MCNC: 113 MG/DL
HBA1C MFR BLD HPLC: 5.6 %
HCT VFR BLD CALC: 38.1 %
HGB BLD-MCNC: 11.5 G/DL
IMM GRANULOCYTES NFR BLD AUTO: 0.3 %
LYMPHOCYTES # BLD AUTO: 2.44 K/UL
LYMPHOCYTES NFR BLD AUTO: 30.7 %
MAN DIFF?: NORMAL
MCHC RBC-ENTMCNC: 24.8 PG
MCHC RBC-ENTMCNC: 30.2 GM/DL
MCV RBC AUTO: 82.1 FL
MONOCYTES # BLD AUTO: 0.48 K/UL
MONOCYTES NFR BLD AUTO: 6 %
NEUTROPHILS # BLD AUTO: 4.66 K/UL
NEUTROPHILS NFR BLD AUTO: 58.6 %
PLATELET # BLD AUTO: 381 K/UL
POTASSIUM SERPL-SCNC: 4.6 MMOL/L
PROT SERPL-MCNC: 6.7 G/DL
RBC # BLD: 4.64 M/UL
RBC # FLD: 14.9 %
SODIUM SERPL-SCNC: 142 MMOL/L
WBC # FLD AUTO: 7.95 K/UL

## 2023-05-05 NOTE — HISTORY OF PRESENT ILLNESS
[FreeTextEntry1] : 32 year F h/o GERD on PPI, PCOS, Fibromyalgia, bipolar 2, anxiety, psoriatic arthritis (humira) , exercise induced asthma, cyst removed from frontal lobe, s/p gastric sleeve 2010, s/p adenoidectomy, family h/o colon polyps, presents for evaluation of vomiting.  She is seen at the request of Dr. Kareen Souza.  Last seen 07/2021 for frequent belching which has since resolved. \par today,\par 2 months of vomiting after eating, \par she has had this happen in the past when she skips her ppi. \par she had to increase her pepcid from 20---> 40 mg at night.  \par she has gained 20-25 lbs in the past year, admits to poor diet choices. \par vomits within 30 min of eating. \par needs to be upright after eating. \par bupring after eating has gotten better. \par needs to sip water, \par no abd pain\par no change in bowel pattern. no dysphagia/odynophagia. \par recently saw neuro for dizziness, \par \par \par EGD/Colonoscopy 1/28/21- GERD/Rectal bleeding/diarrhea- -EGd notable for gastric sleeve, irregular zline\par benign path, \par ileocolonoscopy with bx- internal hemorrhoids, normal expect single focus of mild increased lymphocytes in right colon bx\par recall in 5 years due to family h/o colon polyps. \par \par \par \par father- colon polyps\par brother 28 yo - colon polyps\par \par PMD: Dr. aKreen Souza

## 2023-05-05 NOTE — ASSESSMENT
[FreeTextEntry1] : \par \par GERD-s/p gastric sleeve, previously associated with vomiting/belching\par recent increase in postprandial vomiting in setting recent weight gain. \par -suspect worsening GERD , ?gastroparesis\par -check labs\par -increase PPI to BID, continue H2B at night \par -follow up in 1-2 months or sooner if needed. Patient instructed to call if no improvement in symptoms in the next 2 weeks. \par

## 2023-05-12 ENCOUNTER — APPOINTMENT (OUTPATIENT)
Dept: NEUROLOGY | Facility: CLINIC | Age: 33
End: 2023-05-12

## 2023-05-15 ENCOUNTER — APPOINTMENT (OUTPATIENT)
Dept: NEUROLOGY | Facility: CLINIC | Age: 33
End: 2023-05-15

## 2023-06-08 ENCOUNTER — APPOINTMENT (OUTPATIENT)
Dept: RHEUMATOLOGY | Facility: CLINIC | Age: 33
End: 2023-06-08
Payer: COMMERCIAL

## 2023-06-08 VITALS
WEIGHT: 234 LBS | OXYGEN SATURATION: 98 % | HEART RATE: 84 BPM | BODY MASS INDEX: 45.94 KG/M2 | HEIGHT: 60 IN | DIASTOLIC BLOOD PRESSURE: 78 MMHG | SYSTOLIC BLOOD PRESSURE: 128 MMHG

## 2023-06-08 PROCEDURE — 99214 OFFICE O/P EST MOD 30 MIN: CPT | Mod: 25

## 2023-06-08 RX ORDER — NORELGESTROMIN AND ETHINYL ESTRADIOL 150; 35 UG/D; UG/D
150-35 PATCH TRANSDERMAL
Qty: 9 | Refills: 0 | Status: DISCONTINUED | COMMUNITY
Start: 2021-01-20 | End: 2023-06-08

## 2023-06-12 NOTE — HISTORY OF PRESENT ILLNESS
[FreeTextEntry1] : She continues taking Humira. \par \par She will get occasional flare-ups of skin psoriasis on her right shin and groin.  \par \par No joint pain.  MInimal joint stiffness in the morning.

## 2023-07-26 ENCOUNTER — APPOINTMENT (OUTPATIENT)
Dept: GASTROENTEROLOGY | Facility: CLINIC | Age: 33
End: 2023-07-26
Payer: COMMERCIAL

## 2023-07-26 VITALS
WEIGHT: 234 LBS | SYSTOLIC BLOOD PRESSURE: 118 MMHG | OXYGEN SATURATION: 100 % | HEART RATE: 102 BPM | HEIGHT: 60 IN | BODY MASS INDEX: 45.94 KG/M2 | DIASTOLIC BLOOD PRESSURE: 80 MMHG

## 2023-07-26 PROCEDURE — 99213 OFFICE O/P EST LOW 20 MIN: CPT

## 2023-07-30 NOTE — ASSESSMENT
[FreeTextEntry1] :  GERD-s/p gastric sleeve, previously associated with vomiting/belching recent increase in postprandial vomiting in setting recent weight gain.  -improved on BID PPI with H2B at night.  continue.  counseled patient on healthy diet/exercise.

## 2023-07-30 NOTE — HISTORY OF PRESENT ILLNESS
[FreeTextEntry1] : 32 year F h/o GERD on PPI, PCOS, Fibromyalgia, bipolar 2, anxiety, psoriatic arthritis (humira) , exercise induced asthma, cyst removed from frontal lobe, s/p gastric sleeve 2010, s/p adenoidectomy, family h/o colon polyps, presents for evaluation of vomiting.  She is seen at the request of Dr. Kareen Souza.  Last seen 07/2021 for frequent belching which has since resolved. \par today feeling much better omeprazole 40 mg BID and pepcid at night. \par staying consistent with taking medication, timing of meds and avoidig triggers- alcohol, citrus. \par only burping followed by vomits if she forgets meds, in this situation takes famotidine which is helpful. \par \par \par \par today,\par 2 months of vomiting after eating, \par she has had this happen in the past when she skips her ppi. \par she had to increase her pepcid from 20---> 40 mg at night.  \par she has gained 20-25 lbs in the past year, admits to poor diet choices. \par vomits within 30 min of eating. \par needs to be upright after eating. \par bupring after eating has gotten better. \par needs to sip water, \par no abd pain\par no change in bowel pattern. no dysphagia/odynophagia. \par recently saw neuro for dizziness, \par \par \par EGD/Colonoscopy 1/28/21- GERD/Rectal bleeding/diarrhea- -EGd notable for gastric sleeve, irregular zline\par benign path, \par ileocolonoscopy with bx- internal hemorrhoids, normal expect single focus of mild increased lymphocytes in right colon bx\par recall in 5 years due to family h/o colon polyps. \par \par \par \par father- colon polyps\par brother 28 yo - colon polyps\par \par PMD: Dr. Kareen Souza

## 2023-08-11 NOTE — HISTORY OF PRESENT ILLNESS
[FreeTextEntry1] : Marylou Starks is a 33 yo female with PMH of PCOS, Sleeve in 2011, Bipolar 2, anxiety, eczema, fibromyalgia, spondyloarthropathy, GERD, ADD, benign left frontal lobe cyst  (removed 2011) here for initial consultation.  \par \par Reports in 2008 started to develop right tingling arm/ leg , fatigue diagnosed with benign, frontal lobe cyst transferred to [facility], symptoms resolved.  Symptoms of fatigue, and tingling to right side returned in 2011 which she saw Neurosurgeon MD MD Jovita Rodrigues , removed cyst in 2011.  Started on Topamax for 1 year post procedure for antiepileptic purposes, does not recall ever being diagnosed with seizure like activity (EEG in the past).  Seen by neurologist at the time as well, unsure of name.\par \par Periodically about once a month still has tingling to right arm and leg that lasts up to 10 minutes, denies weakness, blurry vision/ change in vision, change or difficulty in speech.  \par \par Dizziness with positional changes especially when bending forwards and standing up quickly, feels balance is off and objects are moving resolves within minutes.  Triggering factors also include when objects move quickly past her in peripheral vision such as cars while driving.  Denies nausea, vomiting, or tinnitus.  Reports b/l ears feel clogged follows ENT MD Rachid Foreman dx with eustachian tube dysfunction.  \par \par Occasional bitemporal headaches 2-3 x monthly, attributes to allergies.  Receives allergy shots.  Denies photophobia or phonophobia, no aura.  Does not take medication for relief, describes headaches as dull not severe.Sleep- 8-10 hours, diagnosed with mild sleep apnea last year does not use CPAP , intermittent snoring, fatigue, sometimes headaches and dry mouth\par Exercise- walk occasional, needs improvement \par Diet- needs improvement \par Currently working as nanny and starting school for administration\par \par Follows MD Charla Drake psychiatric NP\par \par

## 2023-08-11 NOTE — REASON FOR VISIT
[Consultation] : a consultation visit [FreeTextEntry1] : pt states she had a benign mass in frontal lobe removed. she also states she need an MRI, and has been having dizzy spells.

## 2023-08-11 NOTE — PHYSICAL EXAM
[FreeTextEntry1] : Mental status:\par Orientation: Alert , oriented to month, day of week, year, location Speech is fluent , able to name objects, repeat a sentence and write a sentence Memory: Short term tested by registering 3 objects and recalled 3/3 in 5 min; Long term memory intact based on correct recall of past events and demographic details. Concentration: able to do serial 7 calculation 5/5 and spell world backwards Comprehension : able follow 3 step requests Apraxia and visuospatial able to draw clock drawing and intersecting pentagon Neglect is absent Agnosia is absent \par MMSE 30/30\par Cranial nerves:\par CN I deferred. CN II VFF; ; III, IV, VI: PERRLA, EOM IV: Facial sensation normal B/L to touch, pinprick and temperature VII:Facial strength normal B/L. VIII: Gross hearing intact IX, X: palate midline and elevates symmetrically XI: Trapezius normal strength, XII: Tongue midline without atrophy or fasciculation\par Motor: Muscle tone no rigidity or resistance in all 4 extremities. No atrophy or fasciculation. Muscle strength: arms and legs, proximal and distal flexors and extensors are normal 5/5. No UE drift.\par Sensory: intact to pinprick, temperature sensation to vibration and cold. \par Reflexes: all present, normal, and symmetrical 2+ \par Coordination: finger to nose: normal. Heel to shin: normal\par Gait: steady normal based ; Romberg test negative - difficulty with tandem gait \par \par

## 2023-08-11 NOTE — ASSESSMENT
[FreeTextEntry1] : Marylou Starks is a 33 yo female with PMH of PCOS, Sleeve in 2011, Bipolar 2, anxiety, eczema, fibromyalgia, spondyloarthropathy, GERD, ADD, benign left frontal lobe cyst  (removed 2011) and empirically treated for partial seizure in the past presents  here for initial consultation for episodic dizziness, episodic tingling in her limbs   MRI Head w/wo contrast since it has been several years since repeat imaging performed and would like to assure no recurrence of cyst, demyelinating disease or hydrocephalus . MRi would also help assess if any discrete lesion that would provoke a seizure  Routine and Ambulatory EEG as she was treated with topomax for possible partial seizure in the past Vestibular Testing as she feels dizzy when she see moving object go past here Advised bending knees rather than bending forward to avoid vasalva which may provoke dizziness; Encourage fluid intake and change position slowly to limit dizziness provoked by postural change Will request records from Neurosurgery MD Jovita Rodrigues  in [facility]   Follow up in 2 months

## 2023-09-05 LAB
ALBUMIN SERPL ELPH-MCNC: 4.5 G/DL
ALP BLD-CCNC: 85 U/L
ALT SERPL-CCNC: 50 U/L
ANION GAP SERPL CALC-SCNC: 12 MMOL/L
AST SERPL-CCNC: 31 U/L
BILIRUB SERPL-MCNC: 0.2 MG/DL
BUN SERPL-MCNC: 9 MG/DL
CALCIUM SERPL-MCNC: 9.7 MG/DL
CHLORIDE SERPL-SCNC: 105 MMOL/L
CO2 SERPL-SCNC: 26 MMOL/L
CREAT SERPL-MCNC: 0.66 MG/DL
CRP SERPL-MCNC: 5 MG/L
EGFR: 121 ML/MIN/1.73M2
ERYTHROCYTE [SEDIMENTATION RATE] IN BLOOD BY WESTERGREN METHOD: 20 MM/HR
GLUCOSE SERPL-MCNC: 103 MG/DL
HBV CORE IGG+IGM SER QL: NONREACTIVE
HBV SURFACE AB SER QL: REACTIVE
HBV SURFACE AG SER QL: NONREACTIVE
HCV AB SER QL: NONREACTIVE
HCV S/CO RATIO: 0.14 S/CO
M TB IFN-G BLD-IMP: NEGATIVE
POTASSIUM SERPL-SCNC: 4.7 MMOL/L
PROT SERPL-MCNC: 6.9 G/DL
QUANTIFERON TB PLUS MITOGEN MINUS NIL: >10 IU/ML
QUANTIFERON TB PLUS NIL: 0.03 IU/ML
QUANTIFERON TB PLUS TB1 MINUS NIL: 0 IU/ML
QUANTIFERON TB PLUS TB2 MINUS NIL: 0 IU/ML
SODIUM SERPL-SCNC: 142 MMOL/L

## 2023-09-05 RX ORDER — ADALIMUMAB 40MG/0.4ML
40 KIT SUBCUTANEOUS
Qty: 3 | Refills: 0 | Status: ACTIVE | COMMUNITY
Start: 2021-10-28 | End: 1900-01-01

## 2023-09-08 ENCOUNTER — APPOINTMENT (OUTPATIENT)
Dept: RHEUMATOLOGY | Facility: CLINIC | Age: 33
End: 2023-09-08

## 2023-09-25 ENCOUNTER — NON-APPOINTMENT (OUTPATIENT)
Age: 33
End: 2023-09-25

## 2023-10-25 ENCOUNTER — APPOINTMENT (OUTPATIENT)
Dept: INTERNAL MEDICINE | Facility: CLINIC | Age: 33
End: 2023-10-25

## 2023-11-29 ENCOUNTER — APPOINTMENT (OUTPATIENT)
Dept: GASTROENTEROLOGY | Facility: CLINIC | Age: 33
End: 2023-11-29

## 2023-12-29 ENCOUNTER — APPOINTMENT (OUTPATIENT)
Dept: RHEUMATOLOGY | Facility: CLINIC | Age: 33
End: 2023-12-29
Payer: MEDICAID

## 2023-12-29 VITALS
WEIGHT: 215 LBS | HEART RATE: 83 BPM | DIASTOLIC BLOOD PRESSURE: 74 MMHG | BODY MASS INDEX: 42.21 KG/M2 | OXYGEN SATURATION: 98 % | HEIGHT: 60 IN | SYSTOLIC BLOOD PRESSURE: 112 MMHG

## 2023-12-29 DIAGNOSIS — L40.50 ARTHROPATHIC PSORIASIS, UNSPECIFIED: ICD-10-CM

## 2023-12-29 DIAGNOSIS — L40.9 PSORIASIS, UNSPECIFIED: ICD-10-CM

## 2023-12-29 DIAGNOSIS — M79.7 FIBROMYALGIA: ICD-10-CM

## 2023-12-29 PROCEDURE — 99214 OFFICE O/P EST MOD 30 MIN: CPT

## 2023-12-29 RX ORDER — CLOBETASOL PROPIONATE 0.5 MG/G
0.05 CREAM TOPICAL TWICE DAILY
Qty: 30 | Refills: 1 | Status: ACTIVE | COMMUNITY
Start: 2023-12-29 | End: 1900-01-01

## 2023-12-29 RX ORDER — NAPROXEN 500 MG/1
500 TABLET ORAL
Qty: 42 | Refills: 1 | Status: ACTIVE | COMMUNITY
Start: 2023-12-29 | End: 1900-01-01

## 2023-12-29 NOTE — HISTORY OF PRESENT ILLNESS
[FreeTextEntry1] : 34yo F with PMHx psoriasis and psoriatic arthritis in the office for evaluation  history: patient diagnosed with a form on spondyloarthritis at the age of 17 at the time with body aches, myalgias and joint complains family history of father with psoriatic arthritis years after developed skin psoriasis diagnosed with psoriatic arthritis for the past year on humira improvement of skin and joint complains sporadic arthrlagias  Today no clinical am stiffness today no synovitis no dactylitis stable anterior leg plaque with mild desquamation on humira

## 2023-12-29 NOTE — PHYSICAL EXAM
[General Appearance - Alert] : alert [General Appearance - In No Acute Distress] : in no acute distress [Sclera] : the sclera and conjunctiva were normal [Neck Appearance] : the appearance of the neck was normal [] : no respiratory distress [Auscultation Breath Sounds / Voice Sounds] : lungs were clear to auscultation bilaterally [Heart Rate And Rhythm] : heart rate was normal and rhythm regular [Heart Sounds] : normal S1 and S2 [Nail Clubbing] : no clubbing  or cyanosis of the fingernails [Musculoskeletal - Swelling] : no joint swelling seen [Motor Tone] : muscle strength and tone were normal [FreeTextEntry1] : psoriatic plaques over extensor surface of the legs bilaterally R > L [No Focal Deficits] : no focal deficits [Oriented To Time, Place, And Person] : oriented to person, place, and time

## 2024-01-02 LAB
ALBUMIN SERPL ELPH-MCNC: 4.5 G/DL
ALP BLD-CCNC: 93 U/L
ALT SERPL-CCNC: 57 U/L
ANION GAP SERPL CALC-SCNC: 14 MMOL/L
AST SERPL-CCNC: 35 U/L
BASOPHILS # BLD AUTO: 0.05 K/UL
BASOPHILS NFR BLD AUTO: 0.6 %
BILIRUB SERPL-MCNC: 0.5 MG/DL
BUN SERPL-MCNC: 10 MG/DL
CALCIUM SERPL-MCNC: 9.6 MG/DL
CHLORIDE SERPL-SCNC: 101 MMOL/L
CO2 SERPL-SCNC: 24 MMOL/L
CREAT SERPL-MCNC: 0.71 MG/DL
CRP SERPL-MCNC: 4 MG/L
EGFR: 117 ML/MIN/1.73M2
EOSINOPHIL # BLD AUTO: 0.28 K/UL
EOSINOPHIL NFR BLD AUTO: 3.4 %
ERYTHROCYTE [SEDIMENTATION RATE] IN BLOOD BY WESTERGREN METHOD: 24 MM/HR
GLUCOSE SERPL-MCNC: 139 MG/DL
HCT VFR BLD CALC: 41.7 %
HGB BLD-MCNC: 12.8 G/DL
IMM GRANULOCYTES NFR BLD AUTO: 0.2 %
LYMPHOCYTES # BLD AUTO: 2.06 K/UL
LYMPHOCYTES NFR BLD AUTO: 24.8 %
MAN DIFF?: NORMAL
MCHC RBC-ENTMCNC: 23.9 PG
MCHC RBC-ENTMCNC: 30.7 GM/DL
MCV RBC AUTO: 77.9 FL
MONOCYTES # BLD AUTO: 0.39 K/UL
MONOCYTES NFR BLD AUTO: 4.7 %
NEUTROPHILS # BLD AUTO: 5.51 K/UL
NEUTROPHILS NFR BLD AUTO: 66.3 %
PLATELET # BLD AUTO: 484 K/UL
POTASSIUM SERPL-SCNC: 4.1 MMOL/L
PROT SERPL-MCNC: 6.9 G/DL
RBC # BLD: 5.35 M/UL
RBC # FLD: 15 %
SODIUM SERPL-SCNC: 139 MMOL/L
WBC # FLD AUTO: 8.31 K/UL

## 2024-01-08 ENCOUNTER — APPOINTMENT (OUTPATIENT)
Dept: OBGYN | Facility: CLINIC | Age: 34
End: 2024-01-08
Payer: MEDICAID

## 2024-01-08 VITALS
HEIGHT: 60 IN | WEIGHT: 220 LBS | SYSTOLIC BLOOD PRESSURE: 112 MMHG | BODY MASS INDEX: 43.19 KG/M2 | DIASTOLIC BLOOD PRESSURE: 80 MMHG

## 2024-01-08 DIAGNOSIS — Z01.419 ENCOUNTER FOR GYNECOLOGICAL EXAMINATION (GENERAL) (ROUTINE) W/OUT ABNORMAL FINDINGS: ICD-10-CM

## 2024-01-08 PROCEDURE — 99395 PREV VISIT EST AGE 18-39: CPT

## 2024-01-08 NOTE — HISTORY OF PRESENT ILLNESS
[Patient reported PAP Smear was abnormal] : Patient reported PAP Smear was abnormal [TextBox_4] : 34yo P0  here as a new pt for annual gyn care. C/o irregular periods d/t PCOS dx at age 17.  Not on any birth control. Worried about mood effects of hormonal birth control. Reviewed options- Interested in [place].  Male sexual partner Referral to endocrinologist to consider Metformin again (was on it when she was younger)   LMP/yr: 12/18/23 [place]: 13/very irregular can go up to 6 months without one Hx of abnormal pap: 2021 ASCUS/ hpv neg> colpo at [facility] Hx of STI: HPV Breast mammo/ultrasound: none Procedures on ovaries/uterus/cervix: none  Med hx: bipolar, psoriasis or psoriatic arthritis- sees rheumatologist, ADHD  Work:   at [employer] Diet/exercise/sleep/digestion: walking, light yoga PCP: Dr. garcía   Smoking none: ETOH: social Drugs: none   Interval hx: Changes in family hx:  [PapSmeardate] : 1/2021 [TextBox_31] : ASCUS/ hpv neg

## 2024-01-08 NOTE — DISCUSSION/SUMMARY
[FreeTextEntry1] : Pap/HPV done Breast self exam reinforced STI screening declined Pelvic u/s referral given Endocrinology referral list given Diet and exercise reviewed RTO 1yr or prn

## 2024-01-10 ENCOUNTER — APPOINTMENT (OUTPATIENT)
Dept: OBGYN | Facility: CLINIC | Age: 34
End: 2024-01-10
Payer: MEDICAID

## 2024-01-10 ENCOUNTER — ASOB RESULT (OUTPATIENT)
Age: 34
End: 2024-01-10

## 2024-01-10 PROCEDURE — 76830 TRANSVAGINAL US NON-OB: CPT

## 2024-01-11 LAB — HPV HIGH+LOW RISK DNA PNL CVX: NOT DETECTED

## 2024-01-12 LAB — CYTOLOGY CVX/VAG DOC THIN PREP: NORMAL

## 2024-01-25 ENCOUNTER — APPOINTMENT (OUTPATIENT)
Dept: OBGYN | Facility: CLINIC | Age: 34
End: 2024-01-25
Payer: MEDICAID

## 2024-01-25 DIAGNOSIS — Z30.430 ENCOUNTER FOR INSERTION OF INTRAUTERINE CONTRACEPTIVE DEVICE: ICD-10-CM

## 2024-01-25 PROCEDURE — 58300 INSERT INTRAUTERINE DEVICE: CPT

## 2024-01-25 PROCEDURE — 81025 URINE PREGNANCY TEST: CPT

## 2024-01-29 ENCOUNTER — NON-APPOINTMENT (OUTPATIENT)
Age: 34
End: 2024-01-29

## 2024-01-29 ENCOUNTER — APPOINTMENT (OUTPATIENT)
Dept: SURGERY | Facility: CLINIC | Age: 34
End: 2024-01-29
Payer: MEDICAID

## 2024-01-29 VITALS
HEART RATE: 104 BPM | HEIGHT: 60 IN | DIASTOLIC BLOOD PRESSURE: 84 MMHG | WEIGHT: 220 LBS | BODY MASS INDEX: 43.19 KG/M2 | SYSTOLIC BLOOD PRESSURE: 130 MMHG

## 2024-01-29 DIAGNOSIS — K21.9 GASTRO-ESOPHAGEAL REFLUX DISEASE W/OUT ESOPHAGITIS: ICD-10-CM

## 2024-01-29 PROCEDURE — 99204 OFFICE O/P NEW MOD 45 MIN: CPT

## 2024-01-29 NOTE — ASSESSMENT
[FreeTextEntry1] : 33 yr old female  with BMI of 43 s/p VSG  with weight regain considering revision Will start work up  with Upper GI series and EGD to evaluate the anatomy nutrition consult

## 2024-01-29 NOTE — HISTORY OF PRESENT ILLNESS
[de-identified] : This is a 33 yr of female  with a history of morbid obesity s/p VSg  in 2010 at [facility] presents with weight regain. She reports  also long standing reflux  that is not debilitating and controled with meds. She is interested in revsional surgery for better weight management

## 2024-02-05 LAB
C TRACH RRNA SPEC QL NAA+PROBE: NOT DETECTED
N GONORRHOEA RRNA SPEC QL NAA+PROBE: NOT DETECTED
SOURCE AMPLIFICATION: NORMAL

## 2024-02-07 ENCOUNTER — APPOINTMENT (OUTPATIENT)
Dept: NEUROLOGY | Facility: CLINIC | Age: 34
End: 2024-02-07
Payer: MEDICAID

## 2024-02-07 PROCEDURE — 95819 EEG AWAKE AND ASLEEP: CPT

## 2024-02-07 RX ORDER — OMEPRAZOLE 40 MG/1
40 CAPSULE, DELAYED RELEASE ORAL TWICE DAILY
Qty: 60 | Refills: 4 | Status: ACTIVE | COMMUNITY
Start: 2021-07-28 | End: 1900-01-01

## 2024-02-08 ENCOUNTER — APPOINTMENT (OUTPATIENT)
Dept: NEUROLOGY | Facility: CLINIC | Age: 34
End: 2024-02-08

## 2024-02-08 PROCEDURE — 95700 EEG CONT REC W/VID EEG TECH: CPT

## 2024-02-08 PROCEDURE — 95708 EEG WO VID EA 12-26HR UNMNTR: CPT

## 2024-02-08 PROCEDURE — 95719 EEG PHYS/QHP EA INCR W/O VID: CPT

## 2024-02-21 LAB
HCG UR QL: POSITIVE
QUALITY CONTROL: YES

## 2024-02-26 ENCOUNTER — APPOINTMENT (OUTPATIENT)
Dept: OBGYN | Facility: CLINIC | Age: 34
End: 2024-02-26
Payer: MEDICAID

## 2024-02-26 ENCOUNTER — ASOB RESULT (OUTPATIENT)
Age: 34
End: 2024-02-26

## 2024-02-26 DIAGNOSIS — N93.9 ABNORMAL UTERINE AND VAGINAL BLEEDING, UNSPECIFIED: ICD-10-CM

## 2024-02-26 PROCEDURE — 76830 TRANSVAGINAL US NON-OB: CPT

## 2024-02-26 PROCEDURE — 99213 OFFICE O/P EST LOW 20 MIN: CPT

## 2024-02-26 PROCEDURE — 99459 PELVIC EXAMINATION: CPT

## 2024-02-26 NOTE — HISTORY OF PRESENT ILLNESS
[FreeTextEntry1] : 33-year-old para 0 was seen in January for her annual exam.  Patient has a history of irregular periods and PCOS since the age of 17, an IUD was placed for management of her abnormal uterine bleeding On January 25.  Patient states she has had some spotting on and off since IUD placement.  She passed a clot yesterday.    LMP unsure

## 2024-02-26 NOTE — PHYSICAL EXAM
[Chaperone Present] : A chaperone was present in the examining room during all aspects of the physical examination [FreeTextEntry1] : Phuong Rodriguez [Appropriately responsive] : appropriately responsive [Oriented x3] : oriented x3 [FreeTextEntry2] : Overweight [Normal] : normal [IUD String] : an IUD string was noted [FreeTextEntry4] : scant old bloo in the vault

## 2024-02-26 NOTE — PLAN
[FreeTextEntry1] : Patient counseled that sometimes it takes about 3 months for IUD to fully work Bleeding is much lighter but irregular Patient to keep a menstrual calendar and follow-up in April

## 2024-02-26 NOTE — PROCEDURE
[Locate IUD] : locate IUD [Abnormal Uterine Bleeding] : abnormal uterine bleeding [Transvaginal Ultrasound] : transvaginal ultrasound [L: ___ cm] : L: [unfilled] cm [W: ___cm] : W: [unfilled] cm [H: ___ cm] : H: [unfilled] cm [FreeTextEntry7] : 3.85 x 2.25 x 2.52 cm [FreeTextEntry3] : IUD in the proper position [FreeTextEntry8] : 3.16 x 2.67 x 2.37 cm [FreeTextEntry6] : ES 8.64mm [FreeTextEntry4] : Normal uterus and polycystic appearing ovary

## 2024-02-28 ENCOUNTER — RESULT REVIEW (OUTPATIENT)
Age: 34
End: 2024-02-28

## 2024-03-06 ENCOUNTER — APPOINTMENT (OUTPATIENT)
Dept: ENDOCRINOLOGY | Facility: CLINIC | Age: 34
End: 2024-03-06
Payer: MEDICAID

## 2024-03-06 ENCOUNTER — LABORATORY RESULT (OUTPATIENT)
Age: 34
End: 2024-03-06

## 2024-03-06 VITALS
BODY MASS INDEX: 43.19 KG/M2 | HEIGHT: 60 IN | WEIGHT: 220 LBS | SYSTOLIC BLOOD PRESSURE: 111 MMHG | OXYGEN SATURATION: 98 % | HEART RATE: 85 BPM | DIASTOLIC BLOOD PRESSURE: 78 MMHG

## 2024-03-06 DIAGNOSIS — E28.2 POLYCYSTIC OVARIAN SYNDROME: ICD-10-CM

## 2024-03-06 PROCEDURE — 99204 OFFICE O/P NEW MOD 45 MIN: CPT | Mod: 25

## 2024-03-06 PROCEDURE — G2211 COMPLEX E/M VISIT ADD ON: CPT | Mod: NC,1L

## 2024-03-06 RX ORDER — SEMAGLUTIDE 0.25 MG/.5ML
0.25 INJECTION, SOLUTION SUBCUTANEOUS
Qty: 2 | Refills: 1 | Status: ACTIVE | COMMUNITY
Start: 2024-03-06 | End: 1900-01-01

## 2024-03-06 RX ORDER — ARIPIPRAZOLE 10 MG/1
10 TABLET ORAL
Qty: 30 | Refills: 0 | Status: DISCONTINUED | COMMUNITY
Start: 2023-04-28 | End: 2024-03-06

## 2024-03-06 NOTE — REASON FOR VISIT
[Initial Evaluation] : an initial evaluation [PCOS] : PCOS [Source: ______] : History obtained from [facility] [Weight Management/Obesity] : weight management/obesity [FreeTextEntry2] : Dr. Tash Navarrete

## 2024-03-06 NOTE — HISTORY OF PRESENT ILLNESS
[FreeTextEntry1] : Patient was diagnosed with PCOS at the age of 17 years old.  At that time she was having menstrual irregularity, sometimes more than a month without periods acne and facial hair.  She was subsequently started on metformin which she took for many years.  She has trialed oral contraceptives but has found the adverse effects on her mood to be intolerable. Hormonal IUD was placed in February 2024.  She is still having bleeding which is expected for up to 3 months. She underwent sleeve gastrectomy in 2011; lost approximately 85 pounds which she has since regained.  She is planning to undergo revision of her sleeve gastrectomy with Dr. Pham later this year. She has history of GERD which seems to be made worse by her initial bariatric surgery. She is very interested in having children in the future.  03/06/2024- INITIAL VISIT We discussed increased risk of developing diabetes mellitus due to PCOS.  Patient is interested in metformin but we also discussed treatment with GLP-1 agonist therapy to improve weight loss.  I will prescribe Wegovy; patient has no personal nor familial contraindications to therapy.  If insurance is not reasonably covering this medication, then we will resume metformin in the extended-release formulation to reduce GI complications.  Routine labs will be drawn today.  Patient will return to clinic in 4 months. All questions and concerns were fully addressed to patient's satisfaction. Patient is in agreement with stated plan.

## 2024-03-06 NOTE — PHYSICAL EXAM
[Alert] : alert [Well Nourished] : well nourished [No Acute Distress] : no acute distress [Well Developed] : well developed [Normal Voice/Communication] : normal voice communication [EOMI] : extra ocular movement intact [Normal Hearing] : hearing was normal [No Respiratory Distress] : no respiratory distress [Normal Rate and Effort] : normal respiratory rate and effort [Regular Rhythm] : with a regular rhythm [Normal Rate] : heart rate was normal [No Stigmata of Cushings Syndrome] : no stigmata of Cushings Syndrome [Normal Gait] : normal gait [Oriented x3] : oriented to person, place, and time [Recent Memory Normal] : recent memory was not impaired [Normal Affect] : the affect was normal [Normal Insight/Judgement] : insight and judgment were intact [Normal Mood] : the mood was normal [Remote Memory Normal] : remote memory was not impaired

## 2024-03-06 NOTE — REVIEW OF SYSTEMS
[Heartburn] : heartburn [Diarrhea] : diarrhea [Gas/Bloating] : gas/bloating [Irregular Menses] : irregular menses [As Noted in HPI] : as noted in HPI [Acne] : acne [Hirsutism] : hirsutism [Anxiety] : anxiety [Stress] : stress [Decreased Appetite] : appetite not decreased [Recent Weight Gain (___ Lbs)] : no recent weight gain [Chest Pain] : no chest pain [Palpitations] : no palpitations [Shortness Of Breath] : no shortness of breath [Nausea] : no nausea [Abdominal Pain] : no abdominal pain [Polyuria] : no polyuria [Pelvic Pain] : no pelvic pain [Headaches] : no headaches [Depression] : no depression [Polydipsia] : no polydipsia [Cold Intolerance] : no cold intolerance [Heat Intolerance] : no heat intolerance [Hot Flashes] : no hot flashes [Galactorrhea] : no galactorrhea  [de-identified] : Plucking thick, dark chin hairs

## 2024-03-07 LAB
25(OH)D3 SERPL-MCNC: 22.1 NG/ML
CELIACPAN: NORMAL
CHOLEST SERPL-MCNC: 192 MG/DL
ESTIMATED AVERAGE GLUCOSE: 108 MG/DL
HBA1C MFR BLD HPLC: 5.4 %
HDLC SERPL-MCNC: 61 MG/DL
LDLC SERPL CALC-MCNC: 117 MG/DL
NONHDLC SERPL-MCNC: 131 MG/DL
TRIGL SERPL-MCNC: 72 MG/DL

## 2024-03-10 ENCOUNTER — RESULT REVIEW (OUTPATIENT)
Age: 34
End: 2024-03-10

## 2024-03-11 ENCOUNTER — APPOINTMENT (OUTPATIENT)
Dept: GASTROENTEROLOGY | Facility: HOSPITAL | Age: 34
End: 2024-03-11

## 2024-03-11 ENCOUNTER — RESULT REVIEW (OUTPATIENT)
Age: 34
End: 2024-03-11

## 2024-03-11 DIAGNOSIS — E55.9 VITAMIN D DEFICIENCY, UNSPECIFIED: ICD-10-CM

## 2024-03-11 RX ORDER — VITAMIN K2 90 MCG
1000 CAPSULE ORAL
Qty: 90 | Refills: 0 | Status: ACTIVE | COMMUNITY
Start: 2024-03-11

## 2024-03-18 ENCOUNTER — APPOINTMENT (OUTPATIENT)
Dept: SURGERY | Facility: CLINIC | Age: 34
End: 2024-03-18
Payer: MEDICAID

## 2024-03-18 VITALS
BODY MASS INDEX: 44.17 KG/M2 | WEIGHT: 225 LBS | HEART RATE: 87 BPM | DIASTOLIC BLOOD PRESSURE: 81 MMHG | SYSTOLIC BLOOD PRESSURE: 125 MMHG | HEIGHT: 60 IN

## 2024-03-18 PROCEDURE — 99214 OFFICE O/P EST MOD 30 MIN: CPT

## 2024-03-18 NOTE — REASON FOR VISIT
[Follow-Up Visit] : a follow-up visit for [Morbid Obesity (BMI>40)] : morbid obesity (bmi>40) [S/P Bariatric Surgery] : s/p bariatric surgery [GERD] : gerd

## 2024-03-20 ENCOUNTER — APPOINTMENT (OUTPATIENT)
Dept: PULMONOLOGY | Facility: CLINIC | Age: 34
End: 2024-03-20
Payer: MEDICAID

## 2024-03-20 VITALS
WEIGHT: 225 LBS | OXYGEN SATURATION: 99 % | SYSTOLIC BLOOD PRESSURE: 110 MMHG | BODY MASS INDEX: 44.17 KG/M2 | HEIGHT: 60 IN | DIASTOLIC BLOOD PRESSURE: 70 MMHG | HEART RATE: 79 BPM

## 2024-03-20 DIAGNOSIS — R91.1 SOLITARY PULMONARY NODULE: ICD-10-CM

## 2024-03-20 PROCEDURE — 99203 OFFICE O/P NEW LOW 30 MIN: CPT

## 2024-03-20 NOTE — ASSESSMENT
[FreeTextEntry1] : 2 cm left lower lobe nodule seen on esophagram.  Patient will be referred for a CAT scan of the chest to better evaluate.  Patient is a smoker of 1/2-3/4 of a pack per day.  I have discussed smoking cessation with the patient.

## 2024-03-20 NOTE — HISTORY OF PRESENT ILLNESS
[TextBox_4] : 33-year-old female smoker of 1/2-3/4 of a pack per day since age 19.  She gives a history of mild exercise-induced asthma diagnosis teenager.  When she exerts herself exercising such as a treadmill or an elliptical machine she uses albuterol prior to exercise.  She gets mildly short of breath with exercise only.  She currently is being evaluated for possible bariatric surgery.  She had an esophagram on February 28 in preparation for possible bariatric surgery.  This reports a 2 sonometer nodular density seen at the left lung base.  A CAT scan of the chest was recommended.  Patient has a history of allergy to trees dust cats and dogs.  She occasionally wheezes for around cats.  Past medical history significant for psoriatic arthritis, GERD and polycystic ovary syndrome.

## 2024-03-20 NOTE — PHYSICAL EXAM
[Normal Oropharynx] : normal oropharynx [No Acute Distress] : no acute distress [Normal Appearance] : normal appearance [No Neck Mass] : no neck mass [Normal Rate/Rhythm] : normal rate/rhythm [Normal S1, S2] : normal s1, s2 [No Murmurs] : no murmurs [No Resp Distress] : no resp distress [Clear to Auscultation Bilaterally] : clear to auscultation bilaterally [No Abnormalities] : no abnormalities [Benign] : benign [Normal Gait] : normal gait [No Cyanosis] : no cyanosis [No Clubbing] : no clubbing [No Edema] : no edema [FROM] : FROM [Normal Color/ Pigmentation] : normal color/ pigmentation [No Focal Deficits] : no focal deficits [Oriented x3] : oriented x3 [Normal Affect] : normal affect [TextBox_2] : obese

## 2024-04-06 ENCOUNTER — RESULT REVIEW (OUTPATIENT)
Age: 34
End: 2024-04-06

## 2024-04-08 ENCOUNTER — APPOINTMENT (OUTPATIENT)
Dept: NEUROLOGY | Facility: CLINIC | Age: 34
End: 2024-04-08

## 2024-04-12 NOTE — ASSESSMENT
[FreeTextEntry1] : 33 ty old BMI  43 s/p VSG with severe GERD. History of psoriatic arthritis  on humera and Bipolar disorder well controlled We discussed the use of diet and dietary therapy in the management of GERD and Obesity metabolic Disease. In order to safely and effectively implement the dietary intervention, follow up with our dietician team was recommended..I have recommended conversion to Gastric bypass for better weight loss and control of GERD, risks and benefits discussed. we also discussed the risks associated with pregnancy and patient counseled to avoid pregnancy for 18 months at a minimum.

## 2024-04-12 NOTE — HISTORY OF PRESENT ILLNESS
[de-identified] : 33 yr old female  with history of VSG in 2011 at [facility] preents for follow up after initial evaluation for severe GRD. EGD showed  irregular Z line and severe esophagitis, Upper GI also showed severe GERD up to cervical esophagus.

## 2024-04-15 ENCOUNTER — APPOINTMENT (OUTPATIENT)
Dept: BARIATRICS | Facility: CLINIC | Age: 34
End: 2024-04-15
Payer: MEDICAID

## 2024-04-15 PROCEDURE — 90791 PSYCH DIAGNOSTIC EVALUATION: CPT

## 2024-04-15 NOTE — REASON FOR VISIT
[Initial Consult] : an initial consult for [Morbid Obesity (BMI>40)] : morbid obesity (bmi>40) [Referring By:  ___] : ~[address]~ was referred for psychological evaluation by Dr. LINARES [Attempted Weight Loss] : attempted weight loss [Commitment to Modified Lifestyle] : commitment to a modified lifestyle pre and post surgery [Difficulties with Diet Compliance] : difficulties with diet compliance  [Expectations of Outcome] : expectations of outcome [Motivation for Selecting Surgery] : motivation for selecting surgery [Strength of Social Support System] : strength of social support system [Patient Understands Data May be Shared] : patient understands that the information discussed during the evaluation would be shared with referring provider and possibly with ~his/her~ insurance provider

## 2024-04-16 ENCOUNTER — APPOINTMENT (OUTPATIENT)
Dept: BARIATRICS | Facility: CLINIC | Age: 34
End: 2024-04-16

## 2024-04-17 ENCOUNTER — APPOINTMENT (OUTPATIENT)
Dept: RHEUMATOLOGY | Facility: CLINIC | Age: 34
End: 2024-04-17

## 2024-04-22 ENCOUNTER — APPOINTMENT (OUTPATIENT)
Dept: OBGYN | Facility: CLINIC | Age: 34
End: 2024-04-22

## 2024-05-03 ENCOUNTER — APPOINTMENT (OUTPATIENT)
Dept: SURGERY | Facility: HOSPITAL | Age: 34
End: 2024-05-03

## 2024-05-03 ENCOUNTER — RESULT REVIEW (OUTPATIENT)
Age: 34
End: 2024-05-03

## 2024-05-03 ENCOUNTER — TRANSCRIPTION ENCOUNTER (OUTPATIENT)
Age: 34
End: 2024-05-03

## 2024-05-07 ENCOUNTER — NON-APPOINTMENT (OUTPATIENT)
Age: 34
End: 2024-05-07

## 2024-05-13 ENCOUNTER — APPOINTMENT (OUTPATIENT)
Dept: SURGERY | Facility: CLINIC | Age: 34
End: 2024-05-13
Payer: MEDICAID

## 2024-05-13 VITALS
WEIGHT: 211 LBS | HEART RATE: 107 BPM | BODY MASS INDEX: 41.43 KG/M2 | HEIGHT: 60 IN | SYSTOLIC BLOOD PRESSURE: 127 MMHG | DIASTOLIC BLOOD PRESSURE: 85 MMHG

## 2024-05-13 VITALS
HEART RATE: 107 BPM | HEIGHT: 60 IN | DIASTOLIC BLOOD PRESSURE: 85 MMHG | SYSTOLIC BLOOD PRESSURE: 127 MMHG | BODY MASS INDEX: 41.43 KG/M2 | WEIGHT: 211 LBS

## 2024-05-13 DIAGNOSIS — K21.9 GASTRO-ESOPHAGEAL REFLUX DISEASE W/OUT ESOPHAGITIS: ICD-10-CM

## 2024-05-13 DIAGNOSIS — E66.01 MORBID (SEVERE) OBESITY DUE TO EXCESS CALORIES: ICD-10-CM

## 2024-05-13 DIAGNOSIS — Z90.3 ACQUIRED ABSENCE OF STOMACH [PART OF]: ICD-10-CM

## 2024-05-13 PROCEDURE — 99024 POSTOP FOLLOW-UP VISIT: CPT

## 2024-05-13 NOTE — PHYSICAL EXAM
[de-identified] : nell [de-identified] : wnl [de-identified] : rrr [de-identified] : soft nontender, incisions clean and dry [de-identified] : healing well [de-identified] : ok [de-identified] : no edema noted

## 2024-05-30 ENCOUNTER — APPOINTMENT (OUTPATIENT)
Dept: BARIATRICS | Facility: CLINIC | Age: 34
End: 2024-05-30
Payer: MEDICAID

## 2024-05-30 VITALS — WEIGHT: 203 LBS | BODY MASS INDEX: 39.65 KG/M2

## 2024-05-30 PROCEDURE — 97803 MED NUTRITION INDIV SUBSEQ: CPT | Mod: NC,93

## 2024-06-03 ENCOUNTER — APPOINTMENT (OUTPATIENT)
Dept: SURGERY | Facility: CLINIC | Age: 34
End: 2024-06-03

## 2024-06-03 NOTE — CURRENT PSYCHIATRIC SYMPTOMS
[Depressed Mood] : no depressed mood [Anhedonia] : no anhedonia [Guilt] : not feeling guilty [Decreased Concentration] : no decrease in concentrating ability [Hyperphagia] : no hyperphagia [Insomnia] : no insomnia disorder [Hypersomnia] : no ~T hypersomnia [Psychomotor Agitation] : no agitation [Psychomotor Retardation] : no psychomotor retardation [Anorexia] : no anorexia [Euphoria] : no euphoria [Highly Irritable] : no high irritability [Increased Activity] : no increased in activity [Distractibility] : not distracted [Talkativeness] : no talkativeness [Grandeur] : no feelings of grandeur [Buying Sprees] : no buying sprees [Hypersexuality] : denied hypersexuality [Dec Need For Sleep] : no decreased need for sleep [Delusions] : no ~T delusions [Hallucination Visual] : no visual hallucinations [Hallucination Auditory] : no auditory hallucinations [Thought Disorder] : ~T a thought disorder was not noted [de-identified] : last time over a year ago [FreeTextEntry1] : PPH: bipolar disorder type II, SAMANTHA, has outpatient provider at [facility] [place]. Currently does not have a therapist after her previous therapist left the practice.  Has been in and out of therapy since 14 years old. Currently on Abilify 10 mg PO daily, Paxil 20 mg PO daily, Xanax 0.25 mg daily PRN and Adderall as needed h/o cutting at 14. h/o SA at 14 yo 1 past psych admission Denies current SI/HI/AH/VH/PI  FH: mother - anxiety, brother - anxiety  Tobacco - in the process of quitting now smoking 1 pack per week Alcohol - rare Drugs - occasional cannabis use

## 2024-06-03 NOTE — HISTORY OF PRESENT ILLNESS
[de-identified] : CC: "I had the sleeve 10 years ago but it wasn't the right time. I am seeking a revision."  Patient is a 33 year old female with PMH of PCOS, arthritis, PPH of bipolar disorder who presents for psychiatric evaluation prior to bariatric surgery.   Weight and Diet history She has been overweight from pre-teens . She has tried Daya Adriel, pre-made meals. She had bariatric surgery (sleeve) 10 years and was able to lose 75 lbs and felt good about that weight loss. She was keeping active and improve her diet. She attributes her weight gain due to not taking care of her mental health.  Her current weight is 215 lb, which is her highest lifetime highest. She works as a  in a busy [employer] and is served family style meals at work.   Eating and activity habits She typically eats bread with cheese and fruit for breakfast. For lunch she tends to graze (popcorn, chips, celery and dips). For dinner she sometimes has take out, which she splits into 2 meals. She identifies herself as a slow eater due to her GI issues. She describes per portion sizes as bigger than average. She snacks on popcorn, sweets, celery sticks and [place] chicken dip. She will occasionally have ice cream. She portions out her snacks while watching tv. Denies secretive eating. Denies nighttime eating. Triggers include hunger, anxiety, sadness. Denies binge eating. Denies compensatory behaviors. She and her  go grocery shopping, purchase their frozen entrees some times. She drinks soda at work but is attempting to cut down. Identifies herself as being an emotions eater.   Changes made thus far and changes needed for success The patient was aware of some of the permanent lifestyle changes needed to achieve maximum benefit from surgery. Patient has not met with the program nutritionist yet. She has started to eat her meals by starting with eating veggies and protein instead of eating carbs. At home she has more easily prepared foods. Patient is attempting to cut down her soda intake and switch to [place]. She eats primarily eat at work, where it is harder for her to control what is available to her.   Knowledge and Motivation for surgery The patient is aware of the nature of bariatric surgery, understands potential risks/benefits and related complications. The patient is aware of the changes that must be made in the eating and lifestyle habits, both short- and long- term. Her target weight is 115 lb. Her motivation is to improve her health and to be able to have children one.   Support system Her family, her partner and her friends are all supporting her through this. She will be able to take PTO from work. Denies recent or upcoming psychosocial stressors.   Labs and vitals reviewed.

## 2024-06-03 NOTE — DISCUSSION/SUMMARY
[FreeTextEntry1] : It is recommended that the patient start working with the program nutritionist and engage in outpatient therapy prior to surgery to optimize her mental health, given that she was not successful the first time due to mental health issues.  Patient was also asked to provide a letter of stability from her current psychiatric provider [de-identified] : E66.9 - obesity, unspecified F31.81 - Bipolar disorder, type II F41.1 - SAMANTHA

## 2024-06-03 NOTE — SOCIAL HISTORY
[With Significant Other] : lives with significant other [Employed] : employed [Committed Relationship] : in a committed relationship [Graduate School] : graduate school [FreeTextEntry2] :  at [employer] [FreeTextEntry4] : obtaining masters in public administration

## 2024-06-11 ENCOUNTER — APPOINTMENT (OUTPATIENT)
Dept: PULMONOLOGY | Facility: CLINIC | Age: 34
End: 2024-06-11

## 2024-07-09 ENCOUNTER — APPOINTMENT (OUTPATIENT)
Dept: ENDOCRINOLOGY | Facility: CLINIC | Age: 34
End: 2024-07-09

## 2024-10-01 ENCOUNTER — RX RENEWAL (OUTPATIENT)
Age: 34
End: 2024-10-01

## 2025-01-27 ENCOUNTER — RX RENEWAL (OUTPATIENT)
Age: 35
End: 2025-01-27